# Patient Record
Sex: FEMALE | Race: WHITE | NOT HISPANIC OR LATINO | URBAN - METROPOLITAN AREA
[De-identification: names, ages, dates, MRNs, and addresses within clinical notes are randomized per-mention and may not be internally consistent; named-entity substitution may affect disease eponyms.]

---

## 2023-01-01 ENCOUNTER — EMERGENCY (EMERGENCY)
Facility: HOSPITAL | Age: 88
LOS: 0 days | End: 2023-03-09
Attending: STUDENT IN AN ORGANIZED HEALTH CARE EDUCATION/TRAINING PROGRAM
Payer: MEDICARE

## 2023-01-01 VITALS — HEIGHT: 64 IN | WEIGHT: 110.23 LBS

## 2023-01-01 VITALS
HEART RATE: 60 BPM | OXYGEN SATURATION: 100 % | DIASTOLIC BLOOD PRESSURE: 54 MMHG | SYSTOLIC BLOOD PRESSURE: 122 MMHG | RESPIRATION RATE: 17 BRPM

## 2023-01-01 DIAGNOSIS — Z86.2 PERSONAL HISTORY OF DISEASES OF THE BLOOD AND BLOOD-FORMING ORGANS AND CERTAIN DISORDERS INVOLVING THE IMMUNE MECHANISM: ICD-10-CM

## 2023-01-01 DIAGNOSIS — I46.9 CARDIAC ARREST, CAUSE UNSPECIFIED: ICD-10-CM

## 2023-01-01 DIAGNOSIS — I11.0 HYPERTENSIVE HEART DISEASE WITH HEART FAILURE: ICD-10-CM

## 2023-01-01 DIAGNOSIS — I50.9 HEART FAILURE, UNSPECIFIED: ICD-10-CM

## 2023-01-01 DIAGNOSIS — E03.9 HYPOTHYROIDISM, UNSPECIFIED: ICD-10-CM

## 2023-01-01 DIAGNOSIS — Z20.822 CONTACT WITH AND (SUSPECTED) EXPOSURE TO COVID-19: ICD-10-CM

## 2023-01-01 DIAGNOSIS — Z66 DO NOT RESUSCITATE: ICD-10-CM

## 2023-01-01 LAB
ALBUMIN SERPL ELPH-MCNC: 1.5 G/DL — LOW (ref 3.3–5)
ALP SERPL-CCNC: 89 U/L — SIGNIFICANT CHANGE UP (ref 40–120)
ALT FLD-CCNC: 86 U/L — HIGH (ref 12–78)
ANION GAP SERPL CALC-SCNC: 12 MMOL/L — SIGNIFICANT CHANGE UP (ref 5–17)
ANISOCYTOSIS BLD QL: SLIGHT — SIGNIFICANT CHANGE UP
AST SERPL-CCNC: 151 U/L — HIGH (ref 15–37)
BASE EXCESS BLDA CALC-SCNC: -6.3 MMOL/L — LOW (ref -2–3)
BASOPHILS # BLD AUTO: 0 K/UL — SIGNIFICANT CHANGE UP (ref 0–0.2)
BASOPHILS NFR BLD AUTO: 0 % — SIGNIFICANT CHANGE UP (ref 0–2)
BILIRUB SERPL-MCNC: 0.3 MG/DL — SIGNIFICANT CHANGE UP (ref 0.2–1.2)
BLOOD GAS COMMENTS ARTERIAL: SIGNIFICANT CHANGE UP
BUN SERPL-MCNC: 22 MG/DL — SIGNIFICANT CHANGE UP (ref 7–23)
CALCIUM SERPL-MCNC: 8.2 MG/DL — LOW (ref 8.5–10.1)
CHLORIDE SERPL-SCNC: 102 MMOL/L — SIGNIFICANT CHANGE UP (ref 96–108)
CO2 BLDA-SCNC: 23 MMOL/L — SIGNIFICANT CHANGE UP (ref 19–24)
CO2 SERPL-SCNC: 28 MMOL/L — SIGNIFICANT CHANGE UP (ref 22–31)
CREAT SERPL-MCNC: 0.85 MG/DL — SIGNIFICANT CHANGE UP (ref 0.5–1.3)
D DIMER BLD IA.RAPID-MCNC: HIGH NG/ML DDU
EGFR: 63 ML/MIN/1.73M2 — SIGNIFICANT CHANGE UP
EOSINOPHIL # BLD AUTO: 0 K/UL — SIGNIFICANT CHANGE UP (ref 0–0.5)
EOSINOPHIL NFR BLD AUTO: 0 % — SIGNIFICANT CHANGE UP (ref 0–6)
FLUAV AG NPH QL: SIGNIFICANT CHANGE UP
FLUBV AG NPH QL: SIGNIFICANT CHANGE UP
GAS PNL BLDA: SIGNIFICANT CHANGE UP
GLUCOSE BLDC GLUCOMTR-MCNC: 209 MG/DL — HIGH (ref 70–99)
GLUCOSE SERPL-MCNC: 289 MG/DL — HIGH (ref 70–99)
HCO3 BLDA-SCNC: 21 MMOL/L — SIGNIFICANT CHANGE UP (ref 21–28)
HCT VFR BLD CALC: 28.6 % — LOW (ref 34.5–45)
HGB BLD-MCNC: 7.9 G/DL — LOW (ref 11.5–15.5)
HOROWITZ INDEX BLDA+IHG-RTO: 100 — SIGNIFICANT CHANGE UP
HYPOCHROMIA BLD QL: SIGNIFICANT CHANGE UP
LACTATE SERPL-SCNC: 14.6 MMOL/L — CRITICAL HIGH (ref 0.7–2)
LYMPHOCYTES # BLD AUTO: 10.87 K/UL — HIGH (ref 1–3.3)
LYMPHOCYTES # BLD AUTO: 52 % — HIGH (ref 13–44)
MACROCYTES BLD QL: SLIGHT — SIGNIFICANT CHANGE UP
MAGNESIUM SERPL-MCNC: 2.7 MG/DL — HIGH (ref 1.6–2.6)
MANUAL SMEAR VERIFICATION: SIGNIFICANT CHANGE UP
MCHC RBC-ENTMCNC: 26.4 PG — LOW (ref 27–34)
MCHC RBC-ENTMCNC: 27.6 G/DL — LOW (ref 32–36)
MCV RBC AUTO: 95.7 FL — SIGNIFICANT CHANGE UP (ref 80–100)
MONOCYTES # BLD AUTO: 1.05 K/UL — HIGH (ref 0–0.9)
MONOCYTES NFR BLD AUTO: 5 % — SIGNIFICANT CHANGE UP (ref 2–14)
NEUTROPHILS # BLD AUTO: 8.99 K/UL — HIGH (ref 1.8–7.4)
NEUTROPHILS NFR BLD AUTO: 39 % — LOW (ref 43–77)
NEUTS BAND # BLD: 4 % — SIGNIFICANT CHANGE UP (ref 0–8)
NRBC # BLD: 1 /100 — HIGH (ref 0–0)
NRBC # BLD: SIGNIFICANT CHANGE UP /100 WBCS (ref 0–0)
PCO2 BLDA: 50 MMHG — HIGH (ref 32–46)
PH BLDA: 7.24 — LOW (ref 7.35–7.45)
PLAT MORPH BLD: NORMAL — SIGNIFICANT CHANGE UP
PLATELET # BLD AUTO: 250 K/UL — SIGNIFICANT CHANGE UP (ref 150–400)
PO2 BLDA: 234 MMHG — HIGH (ref 83–108)
POLYCHROMASIA BLD QL SMEAR: SLIGHT — SIGNIFICANT CHANGE UP
POTASSIUM SERPL-MCNC: 5.8 MMOL/L — HIGH (ref 3.5–5.3)
POTASSIUM SERPL-SCNC: 5.8 MMOL/L — HIGH (ref 3.5–5.3)
PROT SERPL-MCNC: 5.2 GM/DL — LOW (ref 6–8.3)
RBC # BLD: 2.99 M/UL — LOW (ref 3.8–5.2)
RBC # FLD: 20.1 % — HIGH (ref 10.3–14.5)
RBC BLD AUTO: SIGNIFICANT CHANGE UP
SAO2 % BLDA: 99.6 % — HIGH (ref 94–98)
SARS-COV-2 RNA SPEC QL NAA+PROBE: SIGNIFICANT CHANGE UP
SODIUM SERPL-SCNC: 142 MMOL/L — SIGNIFICANT CHANGE UP (ref 135–145)
TOXIC GRANULES BLD QL SMEAR: PRESENT — SIGNIFICANT CHANGE UP
TROPONIN I, HIGH SENSITIVITY RESULT: 107 NG/L — HIGH
WBC # BLD: 20.9 K/UL — HIGH (ref 3.8–10.5)
WBC # FLD AUTO: 20.9 K/UL — HIGH (ref 3.8–10.5)

## 2023-01-01 RX ORDER — NOREPINEPHRINE BITARTRATE/D5W 8 MG/250ML
0.05 PLASTIC BAG, INJECTION (ML) INTRAVENOUS
Qty: 8 | Refills: 0 | Status: DISCONTINUED | OUTPATIENT
Start: 2023-01-01 | End: 2023-01-01

## 2023-01-01 RX ORDER — SODIUM CHLORIDE 9 MG/ML
1000 INJECTION INTRAMUSCULAR; INTRAVENOUS; SUBCUTANEOUS ONCE
Refills: 0 | Status: COMPLETED | OUTPATIENT
Start: 2023-01-01 | End: 2023-01-01

## 2023-01-01 RX ORDER — CHLORHEXIDINE GLUCONATE 213 G/1000ML
15 SOLUTION TOPICAL EVERY 12 HOURS
Refills: 0 | Status: DISCONTINUED | OUTPATIENT
Start: 2023-01-01 | End: 2023-01-01

## 2023-01-01 RX ADMIN — Medication 4.69 MICROGRAM(S)/KG/MIN: at 11:58

## 2023-01-01 RX ADMIN — SODIUM CHLORIDE 1000 MILLILITER(S): 9 INJECTION INTRAMUSCULAR; INTRAVENOUS; SUBCUTANEOUS at 12:15

## 2023-01-01 RX ADMIN — Medication 0.05 MICROGRAM(S)/KG/MIN: at 12:56

## 2023-02-27 PROBLEM — Z00.00 ENCOUNTER FOR PREVENTIVE HEALTH EXAMINATION: Status: ACTIVE | Noted: 2023-02-27

## 2023-03-09 NOTE — ED ADULT NURSE NOTE - OBJECTIVE STATEMENT
Pt farrukh from [facility] unresponsive in cardiac arrest, pt intubated PTA ( ett7.0, 23ll),  see code sheet

## 2023-03-09 NOTE — ED PROVIDER NOTE - PHYSICAL EXAMINATION
Intubated, concerned with glidescope, 23 at the lip, secretions in airway suctioned, chest compressions going with luca, abdomen distended, soft, ecchymosis throughout skin, no evidence of trauma.

## 2023-03-09 NOTE — ED PROVIDER NOTE - PROGRESS NOTE DETAILS
RACHANA pérez: Daughter seen at bedside, has proof of healthcare proxy, would like patient to be extubated and medications for pressure to be turned off.  Daughter has no questions at this time.  Dr. Alexander was attempted to be reached by Dr. Mccloud but was unable to get him. Respiratory extubated patient, medications turned off, will monitor, pending TOD. pt's daughter Luisa is at the bedside, states that she is the health care proxy and her mother is a DNR/DNI, requests to have the pt extubation, all fluids and medications stopped, respiratory called for extubation pt without spontaneous respirations, no cardiac activity, without femoral or carotid pulses, pupils fixed and dilated, pt pronounced at 13:38 pm, pt's daughter is at the bedside and made aware

## 2023-03-09 NOTE — ED PROVIDER NOTE - CRITICAL CARE ATTENDING CONTRIBUTION TO CARE
89 year old female with h/o HTN, CHF, GERD, hypothryoidism, GI bleed, anemia and compression fracture presented sent from [facility] found in cardiac arrest by her nurse, EMS report pt had difficulty breathing in the morning and increased oxygen use, pt has molst form which indicates cpr, as per staff pt not a dnr, pt was intubated in the field, cpr initiated, pt found to be in PE, epi x3 total given prior to her arrival, pt present in arrest, cpr continued following acls protocol, tube placement confirmed with chest auscultation and +color change, pt given 4 epi and 2 bicard with ROSC, pt's daughter did present to the ER, states she is the health care proxy, has signed paperwork (copy of it on her phone), she states pt is a DNR and requests for her to be removed from the ventilator, pt was pronounced at 1:38pm

## 2023-03-09 NOTE — ED ADULT NURSE NOTE - CHIEF COMPLAINT QUOTE
patient BIBA as cardiac arrest notification , ACLS in progress , directed patient to resuscitation , room Md and nurses by bed side , intubated on the field #7 ET , patient  was found unresponsive at [facility]

## 2023-03-09 NOTE — ED PROVIDER NOTE - CLINICAL SUMMARY MEDICAL DECISION MAKING FREE TEXT BOX
89-year-old female with past medical history of hypertension, CHF, hypothyroidism, GERD, GI bleed in the past, anemia, compression fracture in thoracic spine presents from [facility] for cardiac arrest.  Difficulty breathing earlier today, increase oxygen and the patient went into cardiac arrest.  EMS intubated with a 7-0 tube, gave 3 rounds of epinephrine, no shockable rhythms in route.  In ER, patient had 4 rounds of epinephrine, 2 of bicarb without any shockable rhythms.  Tube was confirmed with glidescope as well as listening bilaterally. +ROSC, daughter states patient is DNR after this, unclear about medications, daughter on way. HR 60s, BP 60/30s, 2L IVF on pressure bags going, Norepi being started as daughter isnt answering, OG tube placed, ICU aware of patient, will continue to monitor and reassess.

## 2023-03-09 NOTE — ED PROVIDER NOTE - OBJECTIVE STATEMENT
89-year-old female with past medical history of hypertension, CHF, hypothyroidism, GERD, GI bleed in the past, anemia, compression fracture in thoracic spine presents from [facility] for cardiac arrest.  Per EMS, patient was having difficulty breathing earlier today, increase oxygen and the patient on his cardiac arrest.  EMS intubated with a 7 oh tube gave 3 rounds of epinephrine, no struggle rhythms in route.  In ER, patient had multiple rounds of epinephrine, bicarb without any shockable rhythms.  Tube was confirmed with glidescope as well as listening bilaterally.  Patient now has pulses.  Called with daughter Irma,  who is on her way to the emergency room. 89-year-old female with past medical history of hypertension, CHF, hypothyroidism, GERD, GI bleed in the past, anemia, compression fracture in thoracic spine presents from [facility] for cardiac arrest.  Per EMS, patient was having difficulty breathing earlier today, increase oxygen and the patient on his cardiac arrest.  EMS intubated with a 7 oh tube gave 3 rounds of epinephrine, no struggle rhythms in route.  In ER, patient had 4 rounds of epinephrine, 2 of bicarb without any shockable rhythms.  Tube was confirmed with glidescope as well as listening bilaterally.  Patient now has pulses.  Called with daughter Irma,  who is on her way to the emergency room. 89-year-old female with past medical history of hypertension, CHF, hypothyroidism, GERD, GI bleed in the past, anemia, compression fracture in thoracic spine presents from [facility] for cardiac arrest.  Per EMS, patient was having difficulty breathing earlier today, increase oxygen and the patient on his cardiac arrest.  EMS intubated with a 7 oh tube gave 3 rounds of epinephrine, no struggle rhythms in route.  In ER, patient had 4 rounds of epinephrine, 2 of bicarb without any shockable rhythms.  Tube was confirmed with glidescope as well as listening bilaterally.  Patient now has pulses.  Called with daughter Irma,  who is on her way to the emergency room.

## 2023-03-09 NOTE — ED ADULT NURSE NOTE - NSIMPLEMENTINTERV_GEN_ALL_ED
Implemented All Fall Risk Interventions:  [place] to call system. Call bell, personal items and telephone within reach. Instruct patient to call for assistance. Room bathroom lighting operational. Non-slip footwear when patient is off stretcher. Physically safe environment: no spills, clutter or unnecessary equipment. Stretcher in lowest position, wheels locked, appropriate side rails in place. Provide visual cue, wrist band, yellow gown, etc. Monitor gait and stability. Monitor for mental status changes and reorient to person, place, and time. Review medications for side effects contributing to fall risk. Reinforce activity limits and safety measures with patient and family.

## 2023-03-09 NOTE — ED ADULT TRIAGE NOTE - CHIEF COMPLAINT QUOTE
patient BIBA as cardiac arrest notification , ACLS in progress , directed patient to resuscitation , room Md and nurses by bed side , intubated on the field #7 ET , patient  was found unresponsive at [facility]

## 2023-03-09 NOTE — ED ADULT NURSE REASSESSMENT NOTE - NS ED NURSE REASSESS COMMENT FT1
Pt received from [facility], intubated 7.0 ETT 23 @ lipline. Pt stable, started on norepi and NS and titrated to 0.5mcg/kg/hr. Daughter present at bedside, will continue to monitor.

## 2023-03-14 LAB
CULTURE RESULTS: SIGNIFICANT CHANGE UP
CULTURE RESULTS: SIGNIFICANT CHANGE UP
SPECIMEN SOURCE: SIGNIFICANT CHANGE UP
SPECIMEN SOURCE: SIGNIFICANT CHANGE UP